# Patient Record
Sex: MALE | Race: OTHER | HISPANIC OR LATINO | URBAN - METROPOLITAN AREA
[De-identification: names, ages, dates, MRNs, and addresses within clinical notes are randomized per-mention and may not be internally consistent; named-entity substitution may affect disease eponyms.]

---

## 2019-08-24 ENCOUNTER — EMERGENCY (EMERGENCY)
Facility: HOSPITAL | Age: 24
LOS: 1 days | Discharge: HOME | End: 2019-08-24
Attending: EMERGENCY MEDICINE
Payer: COMMERCIAL

## 2019-08-24 VITALS
SYSTOLIC BLOOD PRESSURE: 124 MMHG | RESPIRATION RATE: 18 BRPM | TEMPERATURE: 98 F | OXYGEN SATURATION: 98 % | DIASTOLIC BLOOD PRESSURE: 83 MMHG | HEIGHT: 66.93 IN | HEART RATE: 89 BPM

## 2019-08-24 PROCEDURE — 73130 X-RAY EXAM OF HAND: CPT | Mod: 26,RT

## 2019-08-24 PROCEDURE — 99283 EMERGENCY DEPT VISIT LOW MDM: CPT | Mod: 25

## 2019-08-24 PROCEDURE — 73130 X-RAY EXAM OF HAND: CPT

## 2019-08-24 PROCEDURE — 99283 EMERGENCY DEPT VISIT LOW MDM: CPT

## 2019-08-24 PROCEDURE — 29125 APPL SHORT ARM SPLINT STATIC: CPT

## 2019-08-24 NOTE — ED PROVIDER NOTE - PROGRESS NOTE DETAILS
Read: xr shows base of 5th matacarpal bone fracture.  consistent with history.  dx 5th base metacarpal fx.  ulnar gutter.  motrin for pain.  elevate extremity. see ortho 1 week.  splint care given. left ambulatory.  return precautions given.

## 2019-08-24 NOTE — ED PROVIDER NOTE - MUSCULOSKELETAL [+], MLM
R hand pain, decreased strength of the R hand, decreased sensation over the 4th and 5th Metacarpals.

## 2019-08-24 NOTE — ED ADULT NURSE NOTE - NSIMPLEMENTINTERV_GEN_ALL_ED
Implemented All Universal Safety Interventions:  [place] to call system. Call bell, personal items and telephone within reach. Instruct patient to call for assistance. Room bathroom lighting operational. Non-slip footwear when patient is off stretcher. Physically safe environment: no spills, clutter or unnecessary equipment. Stretcher in lowest position, wheels locked, appropriate side rails in place.
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
## 2019-08-28 PROBLEM — Z78.9 OTHER SPECIFIED HEALTH STATUS: Chronic | Status: ACTIVE | Noted: 2019-08-24

## 2019-09-03 PROBLEM — Z00.00 ENCOUNTER FOR PREVENTIVE HEALTH EXAMINATION: Status: ACTIVE | Noted: 2019-09-03

## 2019-09-06 ENCOUNTER — APPOINTMENT (OUTPATIENT)
Dept: ORTHOPEDIC SURGERY | Facility: CLINIC | Age: 24
End: 2019-09-06
Payer: MEDICAID

## 2019-09-06 DIAGNOSIS — Z78.9 OTHER SPECIFIED HEALTH STATUS: ICD-10-CM

## 2019-09-06 PROCEDURE — 99204 OFFICE O/P NEW MOD 45 MIN: CPT

## 2019-09-06 PROCEDURE — 73130 X-RAY EXAM OF HAND: CPT | Mod: RT

## 2019-09-06 NOTE — DISCUSSION/SUMMARY
[de-identified] : Right 5th CMC fx dislocation\par \par discussed op vs nonop\par \par non-dominant hand heavy ., recommend non-op\par \par SLC w mold to compress and reduce dorsal subluxation\par \par elevate\par ice\par \par po pain tylenol as allergic to nsaids\par \par FU 3-4 weeks

## 2019-09-06 NOTE — PHYSICAL EXAM
[de-identified] : Physical Examination\par General: well nourished, in no acute distress, alert and oriented to person, place and time\par Psychiatric: normal mood and affect, no abnormal movements or speech patterns\par Eyes: vision intact - glasses\par Throat: no thyromegaly\par Lymph: no enlarged nodes, no lymphedema in extremity\par Respiratory: no wheezing, no shortness of breath with ambulation\par Cardiac: no cardiac leg swelling, 2+ peripheral pulses\par Neurology: normal gross sensation in extremities to light touch\par Abdomen: soft, non-tender, tympanic, no masses\par \par Musculoskeletal Examination\par Cervical spine		Full painless range of motion and negative Spurling's test\par \par Hand			Right			Left\par Appearance\par      Skin			normal			normal\par      Swelling/Deformity	deformity 5th CMC w swelling			normal\par  Range of Motion\par      Wrist Flexion		15			75\par      Wrist Extension	10			60\par      Finger Flexion  	1 cm palmar crease	0 cm palmar crease\par      Finger Extension	Full			Full\par Palpation\par      Snuff box		-			-\par      ScaphoLunate 	-			-\par      ECU/Ulna Styloid	-			-\par      Cubital Tunnel 	-			-\par      OTHER		5th CMC			-\par Strength Examination\par      Wrist Flexion		5+ / 0&			5+ / 0\par      Wrist Extension	5+ / 0&			5+ / 0\par      Finger Flexion  	5+ / 0&			5+ / 0\par      Finger Extension	5+ / 0&			5+ / 0\par      			-&			-\par      Pincer		-			-\par Special Examination\par      Finklesteins		-&			-\par      Carpal Tinnel		-&			-\par      Carpal Compression	-&			-\par      Phalens		-&			-\par      Ulnar Canal Tinnel	-&			-\par      Cubital Tunnel Tinnel	-&			-\par      Negron's		-&			-\par Sensation\par      Axillary		normal			normal\par      LatAntCubBrach 	normal			normal\par      Median 		normal			normal\par      Ulnar 		normal			normal\par      Radial 		normal			normal\par Motor\par      AIN 			normal			normal\par      Ulnar 		normal			normal\par      Radial 		normal			normal\par      PIN 			normal			normal\par Pulses\par      Radial	 	2+			2+\par  [de-identified] : 3 view right hand were ordered taken and evaluated by myself today and demonstrate\par CMC fracture dislocation with carpal bone fracture and dorsal subluxation\par \par \par EXAM: HAND RIGHT (MINIMUM 3 VIEWS) \par PROCEDURE DATE: 08/24/2019 \par INTERPRETATION: Right hand \par HISTORY: Mild to moderate swelling for one week \par \par Three views of the right hand show small calcifications near the hamate \par bone in the wrist, near the base of the fifth metacarpal, compatible with \par ligamentous avulsion of indeterminate age. The joint spaces are maintained. \par \par IMPRESSION: Wrist calcifications as noted. \par
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
## 2019-09-27 ENCOUNTER — APPOINTMENT (OUTPATIENT)
Dept: ORTHOPEDIC SURGERY | Facility: CLINIC | Age: 24
End: 2019-09-27
Payer: MEDICAID

## 2019-09-27 PROCEDURE — 73130 X-RAY EXAM OF HAND: CPT | Mod: RT

## 2019-09-27 PROCEDURE — 99214 OFFICE O/P EST MOD 30 MIN: CPT

## 2019-09-27 RX ORDER — DICLOFENAC SODIUM 50 MG/1
50 TABLET, DELAYED RELEASE ORAL
Qty: 30 | Refills: 1 | Status: ACTIVE | COMMUNITY
Start: 2019-09-27 | End: 1900-01-01

## 2019-09-27 NOTE — PHYSICAL EXAM
[de-identified] : Physical Examination\par General: well nourished, in no acute distress, alert and oriented to person, place and time\par Psychiatric: normal mood and affect, no abnormal movements or speech patterns\par Eyes: vision intact - glasses\par Throat: no thyromegaly\par Lymph: no enlarged nodes, no lymphedema in extremity\par Respiratory: no wheezing, no shortness of breath with ambulation\par Cardiac: no cardiac leg swelling, 2+ peripheral pulses\par Neurology: normal gross sensation in extremities to light touch\par Abdomen: soft, non-tender, tympanic, no masses\par \par Musculoskeletal Examination\par Cervical spine		Full painless range of motion and negative Spurling's test\par \par Hand			Right			Left\par Appearance\par      Skin			normal			normal\par      Swelling/Deformity	deformity 5th CMC w improved swelling			normal\par  Range of Motion\par      Wrist Flexion		45			75\par      Wrist Extension	30			60\par      Finger Flexion  	0 cm palmar crease	0 cm palmar crease\par      Finger Extension	Full			Full\par Palpation\par      Snuff box		-			-\par      ScaphoLunate 	-			-\par      ECU/Ulna Styloid	-			-\par      Cubital Tunnel 	-			-\par      OTHER		none over 5th CMC			-\par Strength Examination\par      Wrist Flexion		5+ / 0&			5+ / 0\par      Wrist Extension	5+ / 0&			5+ / 0\par      Finger Flexion  	5+ / 0&			5+ / 0\par      Finger Extension	5+ / 0&			5+ / 0\par      			-&			-\par      Pincer		-			-\par Special Examination\par      Finklesteins		-&			-\par      Carpal Tinnel		-&			-\par      Carpal Compression	-&			-\par      Phalens		-&			-\par      Ulnar Canal Tinnel	-&			-\par      Cubital Tunnel Tinnel	-&			-\par      Negron's		-&			-\par Sensation\par      Axillary		normal			normal\par      LatAntCubBrach 	normal			normal\par      Median 		normal			normal\par      Ulnar 		normal			normal\par      Radial 		normal			normal\par Motor\par      AIN 			normal			normal\par      Ulnar 		normal			normal\par      Radial 		normal			normal\par      PIN 			normal			normal\par Pulses\par      Radial	 	2+			2+\par  [de-identified] : 3 view right hand were ordered taken and evaluated by myself today and demonstrate\par 5th CMC fracture dislocation with carpal bone fracture and dorsal subluxation\par \par \par EXAM: HAND RIGHT (MINIMUM 3 VIEWS) \par PROCEDURE DATE: 08/24/2019 \par INTERPRETATION: Right hand \par HISTORY: Mild to moderate swelling for one week \par \par Three views of the right hand show small calcifications near the hamate \par bone in the wrist, near the base of the fifth metacarpal, compatible with \par ligamentous avulsion of indeterminate age. The joint spaces are maintained. \par \par IMPRESSION: Wrist calcifications as noted. \par \par 3 view right hand were ordered taken and evaluated by myself today and demonstrate\par 5th CMC fracture dislocation with carpal bone fracture and dorsal subluxation unchanged from prior views

## 2019-09-27 NOTE — DISCUSSION/SUMMARY
[de-identified] : Right 5th CMC fx dislocation 5 weeks\par \par discussed op vs nonop\par \par non-dominant hand heavy ., recommend non-op\par \par wrist splint 2 weeks\par \par advance to wbat by 8 weeks\par \par PT to improve strength and motion\par \par diclofenac\par \par return to work 8-10 weeks as tolerated\par \par Serbian  used to discuss long term effects of 5th cmc fx dislocation, reason for non-op and no significant benefit from operative management. however displacement cannot be changed and will have area of deformity\par \par FU 3 months
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
## 2019-09-30 ENCOUNTER — RX CHANGE (OUTPATIENT)
Age: 24
End: 2019-09-30

## 2019-10-02 ENCOUNTER — OTHER (OUTPATIENT)
Age: 24
End: 2019-10-02

## 2022-01-03 ENCOUNTER — EMERGENCY (EMERGENCY)
Facility: HOSPITAL | Age: 27
LOS: 1 days | Discharge: HOME | End: 2022-01-03
Attending: EMERGENCY MEDICINE
Payer: MEDICAID

## 2022-01-03 VITALS
WEIGHT: 191.8 LBS | HEART RATE: 85 BPM | TEMPERATURE: 99 F | DIASTOLIC BLOOD PRESSURE: 90 MMHG | SYSTOLIC BLOOD PRESSURE: 140 MMHG | OXYGEN SATURATION: 98 % | RESPIRATION RATE: 18 BRPM | HEIGHT: 66.93 IN

## 2022-01-03 LAB — SARS-COV-2 RNA SPEC QL NAA+PROBE: DETECTED

## 2022-01-03 PROCEDURE — 87635 SARS-COV-2 COVID-19 AMP PRB: CPT

## 2022-01-03 PROCEDURE — 99284 EMERGENCY DEPT VISIT MOD MDM: CPT

## 2022-01-03 PROCEDURE — 99283 EMERGENCY DEPT VISIT LOW MDM: CPT

## 2022-01-03 RX ORDER — ACETAMINOPHEN 500 MG
975 TABLET ORAL ONCE
Refills: 0 | Status: COMPLETED | OUTPATIENT
Start: 2022-01-03 | End: 2022-01-03

## 2022-01-03 RX ADMIN — Medication 975 MILLIGRAM(S): at 03:27

## 2022-01-03 RX ADMIN — Medication 975 MILLIGRAM(S): at 03:34

## 2022-01-03 NOTE — ED PROVIDER NOTE - PATIENT PORTAL LINK FT
You can access the FollowMyHealth Patient Portal offered by [facility] by registering at the following website: [email or web address]. By joining [facility]’s FollowMyHealth portal, you will also be able to view your health information using other applications (apps) compatible with our system.

## 2022-01-03 NOTE — ED PROVIDER NOTE - OBJECTIVE STATEMENT
Evaluation performed in Armenian.   26 year old male with no significant past medical history presents to the ED with complaints of 5 days of sore throat, non productive cough, and right ear pain. The patient denies any fever, nausea, vomiting, and diarrhea.   Allergies: NSAIDs- swelling

## 2022-01-03 NOTE — ED PROVIDER NOTE - CLINICAL SUMMARY MEDICAL DECISION MAKING FREE TEXT BOX
26 year old male with no significant past medical history presents to the ED with complaints of  5 days of sore throat, non productive cough, and right ear pain. Will send COVID-19 test. Patient offered ibuprofen, but he  reports that he gets swelling with NSAIDs. Patient stable for discharge and will continue Tylenol at home.

## 2022-01-03 NOTE — ED PROVIDER NOTE - ENMT, MLM
Oropharynx clear. Right tympanic membrane is clear. Airway patent, Nasal mucosa clear. Mouth with normal mucosa. Throat has no vesicles, no oropharyngeal exudates and uvula is midline.

## 2022-01-03 NOTE — ED PROVIDER NOTE - NSFOLLOWUPINSTRUCTIONS_ED_ALL_ED_FT
para dolor continue acetaminophen/Tylenol 650mg cada 6 horas.  Regrese a la bobo de emergencia si desarolla difficultad respirando o tu nivel de oxigeno es<95%.      Enfermedades virales en los adultos    Viral Illness, Adult      Los virus son microbios diminutos que entran en el organismo de georgiana persona y causan enfermedades. Hay muchos tipos de virus diferentes y causan muchas clases de enfermedades. Las enfermedades virales pueden ser leves o graves. Pueden afectar diferentes partes del cuerpo.    Entre las afecciones a corto plazo causadas por virus, se incluyen los resfríos y la gripe. Entre las afecciones a nirmala plazo causadas por un virus, incluyen el herpes, la culebrilla y la infección por VIH (virus de inmunodeficiencia humana). Se smiley identificado unos pocos virus asociados con determinados tipos de cáncer.      ¿Cuáles son las causas?    Muchos tipos de virus pueden causar enfermedades. Los virus invaden las células del organismo, se multiplican y provocan que las células infectadas funcionen de manera anormal o mueran. Cuando estas células mueren, liberan más virus. Cuando esto ocurre, aparecen síntomas de la enfermedad, y el virus sigue diseminándose a otras células. Si el virus asume la función de la célula, puede hacer que esta se divida y prolifere de manera descontrolada. Caliente ocurre cuando un virus causa cáncer.  Los diferentes virus ingresan al organismo de distintas formas. Puede contraer un virus de la siguiente manera:  •Al ingerir alimentos o beber agua que smiley entrado en contacto con el virus (están contaminados).      •Al inhalar gotitas que georgiana persona infectada liberó en el aire al toser o estornudar.      •Al tocar georgiana superficie contaminada con el virus y luego llevarse la mano a la boca, la nariz o los ojos.      •Al ser ander por un insecto o mordido por un animal que son portadores del virus.      •Al tener contacto sexual con georgiana persona infectada por el virus.      •Al tener contacto con kurtis o líquidos que contienen el virus, ya sea a través de un lenka abierto o deisi georgiana transfusión.      Si el virus ingresa al organismo, el sistema de defensa del cuerpo (sistema inmunitario) intentará combatirlo. Puede correr un riesgo más alto de tener georgiana enfermedad viral si tiene el sistema inmunitario debilitado.      ¿Cuáles son los signos o síntomas?  Puede tener los siguientes síntomas, dependiendo del tipo de virus y de la ubicación de las células que invade:•Virus del resfrío y de la gripe:  •Fiebre.      •Dolor de aggie.      •Dolor de garganta.      •Mariia musculares.      •Nariz tapada (congestión nasal).      •Tos.      •Virus del aparato digestivo (gastrointestinales):  •Fiebre.      •Dolor en el abdomen.      •Náuseas.      •Diarrea.      •Virus hepáticos (hepatitis):  •Pérdida del apetito.      •Cansancio.      •La piel o las partes mukul de los ojos se ponen rafaela (ictericia).      •Virus del cerebro y la médula young:  •Fiebre.      •Dolor de aggie.      •Rigidez en el juan francisco.      •Náuseas y vómitos.      •Confusión o somnolencia.      •Virus de la piel:  •Verrugas.      •Picazón.      •Erupción cutánea.      •Virus de transmisión sexual:  •Secreción.      •Hinchazón.      •Enrojecimiento.      •Erupción cutánea.          ¿Cómo se diagnostica?  Esta afección se puede diagnosticar en función de lo siguiente:  •Síntomas.      •Antecedentes médicos.      •Examen físico.      •Análisis de kurtis, georgiana muestra de mucosidad de los pulmones (muestra de esputo), muestra de heces o un hisopado de líquidos corporales o georgiana llaga de la piel (lesión).        ¿Cómo se trata?  Los virus pueden ser difíciles de tratar porque se hospedan en las células. Los antibióticos no tratan los virus porque estos medicamentos no llegan al interior de las células. El tratamiento de georgiana enfermedad viral puede incluir lo siguiente:  •Descansar y beber abundantes líquidos.      •Medicamentos para aliviar los síntomas. Estos pueden incluir medicamentos de venta neal para el dolor y la fiebre, medicamentos para la tos o la congestión y medicamentos para aliviar la diarrea.      •Medicamentos antivirales. Estos medicamentos están disponibles únicamente para ciertos tipos de virus.      Algunas enfermedades virales pueden evitarse con vacunas. Un ejemplo frecuente es la vacuna antigripal.      Siga estas instrucciones en wheeler casa:    Medicamentos     •Use los medicamentos de venta neal y los recetados solamente stacey se lo haya indicado el médico.      •Si le recetaron un medicamento antiviral, tómelo stacey se lo haya indicado el médico. No deje de dee dee el antiviral aunque comience a sentirse mejor.    •Infórmese sobre cuándo los antibióticos son necesarios y cuándo no. Los antibióticos no combaten a los virus. Si tiene georgiana infección viral y el médico ilda que también tiene georgiana infección bacteriana, o que está en riesgo de contraerla, ekaterina vez le recete un antibiótico.  •No solicite georgiana receta para antibióticos si le smiley diagnosticado georgiana enfermedad viral. Los antibióticos no harán que se cure más rápidamente.      •[place] antibióticos con frecuencia cuando no son necesarios puede derivar en resistencia a los antibióticos. Cuando esto ocurre, el medicamento pierde wheeler eficacia contra las bacterias que normalmente combate.          Indicaciones generales      •Aurora suficiente líquido para mantener la orina de color amarillo pálido.      •Descanse todo lo que pueda.      •Retome desiere actividades normales según lo indicado por el médico. Pregúntele al médico qué actividades son seguras para usted.      •Concurra a todas las visitas de seguimiento stacey se lo haya indicado el médico. Caliente es importante.        ¿Cómo se previene?  Para reducir el riesgo de tener georgiana enfermedad viral:  •Lávese las musa frecuentemente con agua y jabón deisi al menos 20 segundos. Use desinfectante para musa si no dispone de agua y jabón.      •Evite tocarse la nariz, los ojos y la boca, sobre todo si no se lavó las musa recientemente.      •Si un miembro de la carlton tiene georgiana infección viral, limpie todas las superficies de la casa que puedan sara estado en contacto con el virus. Use [place] y jabón. También puede usar lejía con agua agregada (diluido).      •Manténgase lejos de las personas enfermas con síntomas de georgiana infección viral.      •No comparta objetos tales stacey cepillos de dientes y botellas de agua con otras personas.      •Mantenga las vacunas al día. Caliente incluye recibir la vacuna antigripal todos los años.      •Siga georgiana dieta saludable y descanse lo suficiente.        Comuníquese con un médico si:    •Tiene síntomas de georgiana enfermedad viral que no desaparecen.      •Los síntomas regresan después de sara desaparecido.      •Desiree síntomas empeoran.        Solicite ayuda de inmediato si tiene:    •Dificultad para respirar.      •Dolor de aggie intenso o rigidez en el juan francisco.      •Vómitos abundantes o dolor en el abdomen.      Estos síntomas pueden representar un problema grave que constituye georgiana emergencia. No espere a jey si los síntomas desaparecen. Solicite atención médica de inmediato. Comuníquese con el servicio de emergencias de wheeler localidad (911 en los [place]). No conduzca por desiree propios medios hasta el hospital.       Resumen    •Los virus son tipos de microbios que entran en el organismo de georgiana persona y causan enfermedades. Las enfermedades virales pueden ser leves o graves. Pueden afectar diferentes partes del cuerpo.      •Los virus pueden ser difíciles de tratar. Hay medicamentos para aliviar los síntomas y hay algunos medicamentos antivirales.      •Si le recetaron un medicamento antiviral, tómelo stacey se lo haya indicado el médico. No deje de dee dee el antiviral aunque comience a sentirse mejor.      •Comuníquese con un médico si tiene síntomas de georgiana enfermedad viral que no desaparecen.

## 2024-05-23 ENCOUNTER — EMERGENCY (EMERGENCY)
Facility: HOSPITAL | Age: 29
LOS: 1 days | Discharge: HOME | End: 2024-05-23
Attending: EMERGENCY MEDICINE
Payer: MEDICAID

## 2024-05-23 VITALS
OXYGEN SATURATION: 99 % | RESPIRATION RATE: 17 BRPM | DIASTOLIC BLOOD PRESSURE: 81 MMHG | HEART RATE: 95 BPM | TEMPERATURE: 100 F | HEIGHT: 65 IN | SYSTOLIC BLOOD PRESSURE: 154 MMHG | WEIGHT: 198.42 LBS

## 2024-05-23 LAB
RAPID RVP RESULT: SIGNIFICANT CHANGE UP
SARS-COV-2 RNA SPEC QL NAA+PROBE: SIGNIFICANT CHANGE UP

## 2024-05-23 PROCEDURE — 99284 EMERGENCY DEPT VISIT MOD MDM: CPT

## 2024-05-23 PROCEDURE — 71046 X-RAY EXAM CHEST 2 VIEWS: CPT | Mod: 26

## 2024-05-23 PROCEDURE — 71046 X-RAY EXAM CHEST 2 VIEWS: CPT

## 2024-05-23 PROCEDURE — 0225U NFCT DS DNA&RNA 21 SARSCOV2: CPT

## 2024-05-23 PROCEDURE — 99283 EMERGENCY DEPT VISIT LOW MDM: CPT | Mod: 25

## 2024-05-23 RX ORDER — ACETAMINOPHEN 500 MG
650 TABLET ORAL ONCE
Refills: 0 | Status: COMPLETED | OUTPATIENT
Start: 2024-05-23 | End: 2024-05-23

## 2024-05-23 RX ORDER — DEXTROMETHORPHAN HYDROBROMIDE AND PROMETHAZINE HYDROCHLORIDE 15; 6.25 MG/5ML; MG/5ML
5 SYRUP ORAL
Qty: 105 | Refills: 0
Start: 2024-05-23 | End: 2024-05-29

## 2024-05-23 RX ORDER — AZITHROMYCIN 500 MG/1
1 TABLET, FILM COATED ORAL
Qty: 6 | Refills: 0
Start: 2024-05-23 | End: 2024-05-27

## 2024-05-23 RX ADMIN — Medication 650 MILLIGRAM(S): at 19:33

## 2024-05-23 RX ADMIN — Medication 200 MILLIGRAM(S): at 19:33

## 2024-05-23 NOTE — ED PROVIDER NOTE - PATIENT PORTAL LINK FT
You can access the FollowMyHealth Patient Portal offered by [facility] by registering at the following website: [email or web address]. By joining [facility]’s FollowMyHealth portal, you will also be able to view your health information using other applications (apps) compatible with our system.